# Patient Record
Sex: MALE | Race: WHITE | Employment: FULL TIME | URBAN - METROPOLITAN AREA
[De-identification: names, ages, dates, MRNs, and addresses within clinical notes are randomized per-mention and may not be internally consistent; named-entity substitution may affect disease eponyms.]

---

## 2017-07-24 PROBLEM — R13.10 DYSPHAGIA: Status: ACTIVE | Noted: 2017-07-24

## 2017-07-24 PROBLEM — R13.10 DYSPHAGIA: Chronic | Status: ACTIVE | Noted: 2017-07-24

## 2017-07-24 PROBLEM — Z87.09 HISTORY OF ASTHMA: Chronic | Status: ACTIVE | Noted: 2017-07-24

## 2017-07-24 PROBLEM — R61 HYPERHIDROSIS: Chronic | Status: ACTIVE | Noted: 2017-07-24

## 2017-07-24 PROBLEM — Z87.09 H/O ALLERGIC RHINITIS: Chronic | Status: ACTIVE | Noted: 2017-07-24

## 2017-07-24 PROBLEM — R01.1 MURMUR, CARDIAC: Status: ACTIVE | Noted: 2017-07-24

## 2017-07-24 PROBLEM — R61 HYPERHIDROSIS: Status: ACTIVE | Noted: 2017-07-24

## 2017-07-24 PROBLEM — Z87.09 HISTORY OF ASTHMA: Status: ACTIVE | Noted: 2017-07-24

## 2017-07-24 PROBLEM — R01.1 MURMUR, CARDIAC: Chronic | Status: ACTIVE | Noted: 2017-07-24

## 2017-07-24 PROBLEM — R03.0 BLOOD PRESSURE ELEVATED WITHOUT HISTORY OF HTN: Chronic | Status: ACTIVE | Noted: 2017-07-24

## 2017-07-24 PROBLEM — R03.0 BLOOD PRESSURE ELEVATED WITHOUT HISTORY OF HTN: Status: ACTIVE | Noted: 2017-07-24

## 2017-07-24 PROBLEM — Z87.09 H/O ALLERGIC RHINITIS: Status: ACTIVE | Noted: 2017-07-24

## 2017-07-28 ENCOUNTER — HOSPITAL ENCOUNTER (OUTPATIENT)
Dept: GENERAL RADIOLOGY | Age: 37
Discharge: HOME | End: 2017-07-28
Attending: FAMILY MEDICINE
Payer: COMMERCIAL

## 2017-07-28 VITALS — WEIGHT: 165 LBS | BODY MASS INDEX: 25.9 KG/M2 | HEIGHT: 67 IN

## 2017-07-28 DIAGNOSIS — R13.10 DYSPHAGIA, UNSPECIFIED TYPE: Chronic | ICD-10-CM

## 2017-07-28 PROCEDURE — 74220 X-RAY XM ESOPHAGUS 1CNTRST: CPT

## 2017-07-28 PROCEDURE — 74011000250 HC RX REV CODE- 250: Performed by: FAMILY MEDICINE

## 2017-07-28 PROCEDURE — 74011000255 HC RX REV CODE- 255: Performed by: FAMILY MEDICINE

## 2017-07-28 RX ADMIN — BARIUM SULFATE 700 MG: 700 TABLET ORAL at 09:55

## 2017-07-28 RX ADMIN — ANTACID/ANTIFLATULENT 4 G: 380; 550; 10; 10 GRANULE, EFFERVESCENT ORAL at 09:55

## 2017-07-28 RX ADMIN — BARIUM SULFATE 100 ML: 0.6 SUSPENSION ORAL at 09:55

## 2017-07-28 RX ADMIN — BARIUM SULFATE 100 ML: 980 POWDER, FOR SUSPENSION ORAL at 09:56

## 2017-08-08 PROBLEM — E78.2 MIXED HYPERLIPIDEMIA: Chronic | Status: ACTIVE | Noted: 2017-08-08

## 2017-08-08 PROBLEM — R73.02 GLUCOSE INTOLERANCE (IMPAIRED GLUCOSE TOLERANCE): Chronic | Status: ACTIVE | Noted: 2017-08-08

## 2018-10-09 PROBLEM — R03.0 BLOOD PRESSURE ELEVATED WITHOUT HISTORY OF HTN: Chronic | Status: RESOLVED | Noted: 2017-07-24 | Resolved: 2018-10-09

## 2018-10-09 PROBLEM — K21.9 GASTROESOPHAGEAL REFLUX DISEASE WITHOUT ESOPHAGITIS: Chronic | Status: ACTIVE | Noted: 2018-10-09

## 2018-10-09 PROBLEM — I10 BENIGN ESSENTIAL HTN: Chronic | Status: ACTIVE | Noted: 2018-10-09

## 2020-02-13 PROBLEM — J06.9 URI, ACUTE: Status: ACTIVE | Noted: 2020-02-13

## 2020-02-19 PROBLEM — J06.9 URI, ACUTE: Status: RESOLVED | Noted: 2020-02-13 | Resolved: 2020-02-19

## 2022-03-18 PROBLEM — R13.10 DYSPHAGIA: Status: ACTIVE | Noted: 2017-07-24

## 2022-03-18 PROBLEM — R61 HYPERHIDROSIS: Status: ACTIVE | Noted: 2017-07-24

## 2022-03-18 PROBLEM — I10 BENIGN ESSENTIAL HTN: Status: ACTIVE | Noted: 2018-10-09

## 2022-03-19 PROBLEM — Z87.09 H/O ALLERGIC RHINITIS: Status: ACTIVE | Noted: 2017-07-24

## 2022-03-19 PROBLEM — R01.1 MURMUR, CARDIAC: Status: ACTIVE | Noted: 2017-07-24

## 2022-03-19 PROBLEM — Z87.09 HISTORY OF ASTHMA: Status: ACTIVE | Noted: 2017-07-24

## 2022-03-19 PROBLEM — E78.2 MIXED HYPERLIPIDEMIA: Status: ACTIVE | Noted: 2017-08-08

## 2022-03-20 PROBLEM — R73.02 GLUCOSE INTOLERANCE (IMPAIRED GLUCOSE TOLERANCE): Status: ACTIVE | Noted: 2017-08-08

## 2022-03-20 PROBLEM — K21.9 GASTROESOPHAGEAL REFLUX DISEASE WITHOUT ESOPHAGITIS: Status: ACTIVE | Noted: 2018-10-09

## 2022-06-09 ENCOUNTER — TELEPHONE (OUTPATIENT)
Dept: CARDIOLOGY CLINIC | Age: 42
End: 2022-06-09

## 2022-06-09 NOTE — LETTER
[facility] CARDIOLOGY  [address] [address]  Phone: [phone]  Fax: [phone]    Anali Randolph MD        Suze 10, 2022     Patient: Lv Kelly   YOB: 1980   Date of Visit: 6/9/2022       To Whom It May Concern: It is my medical opinion that Lv Kelly does not have any restrictions from a cardiac standpoint. [facility] may go on the camping trip with his son. If you have any questions or concerns, please don't hesitate to call.     Sincerely,        Anali Randolph MD

## 2022-06-09 NOTE — TELEPHONE ENCOUNTER
Pt is needing cardiac clearance for him to be able to go on a camping trip with his son .  Please call pt ASAP ,they are suppose to be leaving in the am and just know let him know he had to have this